# Patient Record
Sex: FEMALE | Race: BLACK OR AFRICAN AMERICAN
[De-identification: names, ages, dates, MRNs, and addresses within clinical notes are randomized per-mention and may not be internally consistent; named-entity substitution may affect disease eponyms.]

---

## 2017-07-25 ENCOUNTER — HOSPITAL ENCOUNTER (EMERGENCY)
Dept: HOSPITAL 17 - NEPK | Age: 22
Discharge: HOME | End: 2017-07-25
Payer: COMMERCIAL

## 2017-07-25 VITALS — HEIGHT: 64 IN | BODY MASS INDEX: 33.87 KG/M2 | WEIGHT: 198.42 LBS

## 2017-07-25 VITALS
TEMPERATURE: 98.2 F | RESPIRATION RATE: 14 BRPM | OXYGEN SATURATION: 98 % | HEART RATE: 81 BPM | SYSTOLIC BLOOD PRESSURE: 164 MMHG | DIASTOLIC BLOOD PRESSURE: 93 MMHG
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
## 2017-07-25 DIAGNOSIS — S93.402A: Primary | ICD-10-CM

## 2017-07-25 PROCEDURE — 73620 X-RAY EXAM OF FOOT: CPT

## 2017-07-25 PROCEDURE — 73610 X-RAY EXAM OF ANKLE: CPT

## 2017-07-25 PROCEDURE — E0113 CRUTCH UNDERARM EACH WOOD: HCPCS

## 2017-07-25 PROCEDURE — 99283 EMERGENCY DEPT VISIT LOW MDM: CPT

## 2017-07-25 NOTE — RADRPT
EXAM DATE/TIME:  07/25/2017 17:47 

 

[facility] COMPARISON:     

No previous studies available for comparison.

 

                     

INDICATIONS :     

Left foot pain post fall yesterday. 

                     

 

MEDICAL HISTORY :     

None.          

 

SURGICAL HISTORY :     

None.   

 

ENCOUNTER:     

Initial                                        

 

ACUITY:     

2 days      

 

PAIN SCORE:     

10/10

 

LOCATION:     

Left  foot. 

 

FINDINGS:     

Two view examination of the left foot demonstrates no soft tissue swelling, dislocation, or fracture.
  The calcaneus is intact.  Bony mineralization is normal.

 

CONCLUSION:     No acute disease.  

 

 

 

 Kwame Wilder MD on July 25, 2017 at 18:02           

Board Certified Radiologist.

 This report was verified electronically.

## 2017-07-25 NOTE — RADRPT
EXAM DATE/TIME:  07/25/2017 17:46 

 

[facility] COMPARISON:     

No previous studies available for comparison.

 

                     

INDICATIONS :     

Left ankle pain post fall yesterday. 

                     

 

MEDICAL HISTORY :     

None.          

 

SURGICAL HISTORY :     

None.   

 

ENCOUNTER:     

Initial                                        

 

ACUITY:     

2 days      

 

PAIN SCORE:     

10/10

 

LOCATION:     

Left ankle. 

 

FINDINGS:     

There is soft-tissue swelling adjacent to the lateral malleolus.  There is no acute fracture or dislo
cation.  The ankle mortise is intact.  

 

CONCLUSION:

1. Soft tissue swelling adjacent to the lateral malleolus.  

2. No acute fracture or dislocation.       

 

 

 

 Kwame Wilder MD on July 25, 2017 at 18:02                

Board Certified Radiologist.

 This report was verified electronically.

## 2017-07-25 NOTE — PD
HPI


Chief Complaint:  Injury


Time Seen by Provider:  17:20


Travel History


International Travel<30 days:  No


Contact w/Intl Traveler<30days:  No


Traveled to known affect area:  No





History of Present Illness


HPI


22-year-old female presents emergency department for evaluation of left ankle 

and foot pain status post twisting injury prior to arrival.  Patient reports 

she rolled the ankle while getting out of her car.  She felt immediate pain to 

the lateral aspect of the ankle and lateral foot.  She has difficulty 

weightbearing.  Pain is relieved with rest and elevation. Severity 5 out of 10.

  She denies numbness/tingling/weakness in the extremity.





Formerly [facility]


Past Medical History


Medical History:  Denies Significant Hx


Diminished Hearing:  No


Immunizations Current:  Yes


Tetanus Vaccination:  < 5 Years


Influenza Vaccination:  No


Pregnant?:  Not Pregnant


LMP:  17


:  1


Para:  0





Past Surgical History


Surgical History:  No Previous Surgery


Other Surgery:  Yes (IUD)





Social History


Alcohol Use:  No


Tobacco Use:  No


Substance Use:  Yes (MARIJUANA OCC)





Allergies-Medications


(Allergen,Severity, Reaction):  


Coded Allergies:  


     No Known Allergies (Verified , 17)


Reported Meds & Prescriptions





Reported Meds & Active Scripts


Active








Review of Systems


Except as stated in HPI:  all other systems reviewed are Neg





Physical Exam


Narrative


GENERAL: Well-nourished, well-developed patient.


SKIN: Focused skin assessment warm/dry.


HEAD: Normocephalic.


EYES: No scleral icterus. No injection or drainage. 


NECK: Supple, trachea midline. No JVD or lymphadenopathy.


CARDIOVASCULAR: Regular rate and rhythm without murmurs, gallops, or rubs. 


RESPIRATORY: Breath sounds equal bilaterally. No accessory muscle use.


GASTROINTESTINAL: Abdomen soft, non-tender, nondistended. 


MUSCULOSKELETAL: No cyanosis, or edema.  Left ankle notable swelling and 

tenderness over the lateral malleolus and base of fifth metatarsal.  2+ distal 

pulses.  No deformity.  Extremities neurovascularly intact.


BACK: Nontender without obvious deformity. No CVA tenderness.





Data


Data


Last Documented VS





Vital Signs








  Date Time  Temp Pulse Resp B/P Pulse Ox O2 Delivery O2 Flow Rate FiO2


 


17 17:26      Room Air  


 


17 16:19 98.2 81 14 164/93 98   








Orders





 Foot, Limited (2vws) (17 )


Ankle, Complete ([id number]) (17 )








[facility]


Medical Decision Making


Medical Screen Exam Complete:  Yes


Emergency Medical Condition:  Yes


Differential Diagnosis


Ankle sprain versus fracture, midfoot sprain versus metatarsal fracture


Narrative Course


22-year-old female with left ankle and foot pain status post twisting injury.  

Patient has notable swelling and tenderness to the lateral malleolus and the 

base of the fifth metatarsal.  X-ray ordered and pending





X-ray of the left ankle and foot are negative for fracture.  





Patient will be treated for ankle sprain.  Ace wrap and crutches given to 

patient.  Instructed to take over-the-counter Motrin as needed.  Patient 

verbalizes understanding and agrees to plan.





Diagnosis





 Primary Impression:  


 Ankle sprain


 Qualified Code:  S93.402A - Sprain of left ankle, unspecified ligament, 

initial encounter


Referrals:  


Primary Care Physician





***Additional Instructions:


Ice and elevate the extremity


 use the crutches until weightbearing as possible.


Review the Ace wrap for support of the ankle.


Follow-up with her primary care doctor.


Take over-the-counter Motrin [id number] milligrams by mouth every 6-8 hours as 

needed for pain.


Disposition:  01 DISCHARGE HOME


Condition:  Stable








Marialuisa Dong 2017 17:39

## 2017-12-08 ENCOUNTER — HOSPITAL ENCOUNTER (EMERGENCY)
Dept: HOSPITAL 17 - HOBED | Age: 22
Discharge: HOME | End: 2017-12-08
Payer: MEDICAID

## 2017-12-08 DIAGNOSIS — Z3A.19: ICD-10-CM

## 2017-12-08 DIAGNOSIS — O21.9: Primary | ICD-10-CM

## 2017-12-08 PROCEDURE — 99283 EMERGENCY DEPT VISIT LOW MDM: CPT

## 2017-12-08 NOTE — PD
HPI


Chief Complaint


Vomiting blood


Date Seen:  Dec 8, 2017


Time Seen:  16:17


Travel History


International Travel<30 Days:  No


Contact w/Intl Traveler<30Days:  No


Known Affected Area:  No





History of Present Illness


HPI


22-year-old  2 para 1 at 19 weeks gestation who reports that she has had 

vomiting since this morning.  She has battled nausea and vomiting of pregnancy 

up to this point and has been well-controlled with likely just.  She 

discontinued taking this yesterday.  She has a picture that she showed me from 

her phone of her vomit which shows very faint pink streaking of primarily mucus 

emesis.


She denies any fever chills, diarrhea, sickness in other family members.





History


Past Medical History


Medical History:  Denies Significant Hx





Past Surgical History


Surgical History:  No Previous Surgery





Family History


Family History:  Negative





Social History


Alcohol Use:  No


Tobacco Use:  No


Substance Abuse:  No





Allergies-Medications


(Allergen,Severity, Reaction):  


Coded Allergies:  


     No Known Allergies (Verified  Allergy, Unknown, 17)





Review of Systems


Except as stated in HPI:  all other systems reviewed are Neg





Physical Exam


Narrative


GENERAL: Well-nourished, well-developed patient.


SKIN: Warm and dry.


HEAD: Normocephalic and atraumatic.


EYES: No scleral icterus. No injection or drainage. 


ENT: No nasal drainage noted. Mucous membranes pink. Airway patent.


NECK: Supple, trachea midline. No JVD.


CARDIOVASCULAR: Regular rate and rhythm without murmurs, gallops, or rubs. 


RESPIRATORY: Breath sounds equal bilaterally. No accessory muscle use.


ABDOMEN/GI: Abdomen soft, non-tender, bowel sounds present, no rebound, no 

guarding 


   Gravid to [-] weeks size


   Fundal Height: [-]





FHT's: 


   Category: [-]   


   Baseline: [150-]   


   Reactive: [-]   


   Variability: [-]  


   Decels: [-]  


EXTREMITIES: No cyanosis or edema.


BACK: Nontender without obvious deformity. No CVA tenderness.


NEUROLOGICAL: Awake and alert. Motor and sensory grossly within normal limits. 

Five out of 5 muscle strength in all muscle groups. Normal speech.





Data


Data


Vital Signs Reviewed:  Yes


Orders





 Orders


Ondansetron  Odt (Zofran  Odt) (17 16:15)





MDM


Medical Record Reviewed:  Yes


Narrative Course / MDM


Assessment: 19 week intrauterine pregnancy with nausea and vomiting of pregnancy





Plan: The patient was given Zofran 8 mg ODT.  She was instructed to resume her 

diclegis.


Diagnosis


Diagnosis:  


 Primary Impression:  


 19 weeks gestation of pregnancy


 Additional Impression:  


 Nausea and vomiting during pregnancy prior to 22 weeks gestation


Disposition:  01 DISCHARGE HOME


Condition:  Good











Armando Solis MD Dec 8, 2017 16:21

## 2018-02-14 ENCOUNTER — HOSPITAL ENCOUNTER (EMERGENCY)
Dept: HOSPITAL 17 - HOBED | Age: 23
Discharge: HOME | End: 2018-02-14
Payer: MEDICAID

## 2018-02-14 DIAGNOSIS — N89.8: ICD-10-CM

## 2018-02-14 DIAGNOSIS — O26.893: Primary | ICD-10-CM

## 2018-02-14 DIAGNOSIS — Z3A.29: ICD-10-CM

## 2018-02-14 DIAGNOSIS — R10.30: ICD-10-CM

## 2018-02-14 PROCEDURE — 84112 EVAL AMNIOTIC FLUID PROTEIN: CPT

## 2018-02-14 PROCEDURE — 99284 EMERGENCY DEPT VISIT MOD MDM: CPT

## 2018-02-14 NOTE — PD
HPI


Chief Complaint


Abdominal pain and brown vaginal discharge 1 day


Date Seen:  2018


Time Seen:  14:22


Travel History


International Travel<30 Days:  No


Contact w/Intl Traveler<30Days:  No


Known Affected Area:  No





History of Present Illness


HPI


This is 23-year-old black female  at 29-1/2 weeks sees Dr. Lynn for 

prenatal care presents complaining of lower abdominal pain and vaginal brown 

discharge for 1 day.  The baby is active fetal heart rate tracing is reactive 

and she is not deepika.  Amnio sure was done was negative


Weeks Gestation:  29


Para:  1


:  2





History


Obstetric History


Obstetric History


One vaginal delivery





Social History


Alcohol Use:  No


Tobacco Use:  No


Substance Abuse:  No





Allergies-Medications


(Allergen,Severity, Reaction):  


Coded Allergies:  


     No Known Allergies (Verified  Allergy, Unknown, 17)





Review of Systems


General / Constitutional:  No: Fever, Weight Gain, Chills, Other


Eyes:  No: Diploplia, Blurred Vision, Visual changes, Pain, Photophobia


HENT:  No: Headaches, Vertigo, Lightheadedness


Cardiovascular:  No: Irregular Rhythm, Chest Pain or Discomfort, Palpitations, 

Tachycardia, Syncope, Varicosities, Edema, Cyanosis


Respiratory:  No: Cough, Short of Breath, Other


Gastrointestinal:  Abdominal Pain, No: Nausea, Vomiting, Diarrhea


Genitourinary:  Discharge, No: Decreased Urinary Output, Oliguria


Musculoskeletal:  No: Limited ROM, Weakness, Cramping, Edema, Pain


Skin:  No Rash, No Itching, No Dryness, No Lumps, No Change in Pigmentation, No 

Change in Nails, No Alopecia, No Lesions


Neurologic:  No: Weakness, Dizziness, Syncope, Focal Abnormalities, 

Coordination Problem, Headache, Slurred Speech, Seizures


Psychiatric:  No: Depression, Suicidal Ideations, Homicidal Ideation


Endocrine:  No: Heat Intolerance, Cold Intolerance, Polydipsia, Polyuria, Other





Physical Exam


Narrative


GENERAL: Well-nourished, well-developed patient.


SKIN: Warm and dry.


HEAD: Normocephalic and atraumatic.


EYES: No scleral icterus. No injection or drainage. 


ENT: No nasal drainage noted. Mucous membranes pink. Airway patent.


NECK: Supple, trachea midline. No JVD.


CARDIOVASCULAR: Regular rate and rhythm without murmurs, gallops, or rubs. 


RESPIRATORY: Breath sounds equal bilaterally. No accessory muscle use.


BREASTS: Bilateral exam showed no masses , no retractions, no nipple discharge.


ABDOMEN/GI: Abdomen soft, non-tender, bowel sounds present, no rebound, no 

guarding 


   Gravid to [-29] weeks size


   Fundal Height: [29-]


GENITOURINARY: 


   External Genitalia: intact and normal in appearance


    Speculum exam-- no bleeding noted in the vagina only a very minimal amount 

of brownish discharge seen, the cervix appeared noninfected thick and closed


   Cervix: [Normal closed thick-]


   Dilatation: [-0]          


   Effacement: [-0]          


   Station: [-3]  


            


   Membranes: [intact ]


   Uterine Contractions: [None-]


FHT's: 


   Category: [1-]   


   Baseline: [-133]   


   Reactive: [-R]   


   Variability: [mod-]  


   Decels: [-none]  


EXTREMITIES: No cyanosis or edema.


BACK: Nontender without obvious deformity. No CVA tenderness.


NEUROLOGICAL: Awake and alert. Motor and sensory grossly within normal limits. 

Five out of 5 muscle strength in all muscle groups. Normal speech.





Data


Data


Labs


Urine dipstick on OB ED is negative amnio sure negative





MDM


Interpretation(s)


Patient is 23-year-old black female  at 29-30 weeks patient Dr. burger awake 

presents with lower abdominal pain and vaginal brown discharge for 1 day.  No 

active bleeding no red blood seen, no leakage of fluid, fetal heart rate 

tracing is reactive and there are no contractions.  Cervix is seen on speculum 

exam noted   no infection no bleeding seen ,cervix is closed and thick, only 

small amount of brownish discharge seen.  Urine was negative.  Pain likely due 

to soft tissue strain


Plan


Plan patient be at home at bedrest for the next day or so, Tylenol as needed 

for pain, increase fluids for hydration, heating pad on lower abdomen is 

negative for symptoms.  She is to follow-up with Dr. Lynn she says tomorrow


Diagnosis


Diagnosis:  


 Primary Impression:  


 Vaginal discharge during pregnancy in third trimester


 Additional Impression:  


 Abdominal pain during pregnancy in third trimester


Disposition:  01 DISCHARGE HOME


Condition:  Stable











Jared Booth II, MD 2018 14:29

## 2018-03-20 ENCOUNTER — HOSPITAL ENCOUNTER (EMERGENCY)
Dept: HOSPITAL 17 - HOBED | Age: 23
Discharge: HOME | End: 2018-03-20
Payer: MEDICAID

## 2018-03-20 VITALS — SYSTOLIC BLOOD PRESSURE: 91 MMHG | DIASTOLIC BLOOD PRESSURE: 54 MMHG | HEART RATE: 98 BPM

## 2018-03-20 VITALS — DIASTOLIC BLOOD PRESSURE: 51 MMHG | SYSTOLIC BLOOD PRESSURE: 89 MMHG | RESPIRATION RATE: 17 BRPM | HEART RATE: 115 BPM

## 2018-03-20 VITALS — SYSTOLIC BLOOD PRESSURE: 116 MMHG | DIASTOLIC BLOOD PRESSURE: 66 MMHG | HEART RATE: 100 BPM

## 2018-03-20 VITALS — HEART RATE: 94 BPM

## 2018-03-20 VITALS — RESPIRATION RATE: 17 BRPM | SYSTOLIC BLOOD PRESSURE: 104 MMHG | DIASTOLIC BLOOD PRESSURE: 59 MMHG | HEART RATE: 87 BPM

## 2018-03-20 VITALS — HEART RATE: 103 BPM

## 2018-03-20 VITALS — HEART RATE: 95 BPM

## 2018-03-20 VITALS — HEART RATE: 61 BPM

## 2018-03-20 VITALS — HEART RATE: 97 BPM

## 2018-03-20 VITALS — HEART RATE: 88 BPM

## 2018-03-20 DIAGNOSIS — Z3A.34: ICD-10-CM

## 2018-03-20 DIAGNOSIS — O12.13: Primary | ICD-10-CM

## 2018-03-20 LAB
ALBUMIN SERPL-MCNC: 2.5 GM/DL (ref 3.4–5)
ALP SERPL-CCNC: 106 U/L (ref 45–117)
ALT SERPL-CCNC: 13 U/L (ref 10–53)
AST SERPL-CCNC: 17 U/L (ref 15–37)
BACTERIA #/AREA URNS HPF: (no result) /HPF
BASOPHILS # BLD AUTO: 0 TH/MM3 (ref 0–0.2)
BASOPHILS NFR BLD: 0.4 % (ref 0–2)
BILIRUB SERPL-MCNC: 0.3 MG/DL (ref 0.2–1)
BUN SERPL-MCNC: 6 MG/DL (ref 7–18)
CALCIUM SERPL-MCNC: 8.2 MG/DL (ref 8.5–10.1)
CHLORIDE SERPL-SCNC: 103 MEQ/L (ref 98–107)
COLOR UR: YELLOW
CREAT SERPL-MCNC: 0.55 MG/DL (ref 0.5–1)
EOSINOPHIL # BLD: 0.1 TH/MM3 (ref 0–0.4)
EOSINOPHIL NFR BLD: 0.9 % (ref 0–4)
ERYTHROCYTE [DISTWIDTH] IN BLOOD BY AUTOMATED COUNT: 13.5 % (ref 11.6–17.2)
GFR SERPLBLD BASED ON 1.73 SQ M-ARVRAT: 163 ML/MIN (ref 89–?)
GLUCOSE SERPL-MCNC: 81 MG/DL (ref 74–106)
GLUCOSE UR STRIP-MCNC: (no result) MG/DL
HCO3 BLD-SCNC: 22.2 MEQ/L (ref 21–32)
HCT VFR BLD CALC: 30.6 % (ref 35–46)
HGB BLD-MCNC: 10.3 GM/DL (ref 11.6–15.3)
HGB UR QL STRIP: (no result)
KETONES UR STRIP-MCNC: (no result) MG/DL
LYMPHOCYTES # BLD AUTO: 1.5 TH/MM3 (ref 1–4.8)
LYMPHOCYTES NFR BLD AUTO: 21.4 % (ref 9–44)
MCH RBC QN AUTO: 27.4 PG (ref 27–34)
MCHC RBC AUTO-ENTMCNC: 33.6 % (ref 32–36)
MCV RBC AUTO: 81.4 FL (ref 80–100)
MONOCYTE #: 0.5 TH/MM3 (ref 0–0.9)
MONOCYTES NFR BLD: 6.9 % (ref 0–8)
MUCOUS THREADS #/AREA URNS LPF: (no result) /LPF
NEUTROPHILS # BLD AUTO: 4.9 TH/MM3 (ref 1.8–7.7)
NEUTROPHILS NFR BLD AUTO: 70.4 % (ref 16–70)
NITRITE UR QL STRIP: (no result)
PLATELET # BLD: 254 TH/MM3 (ref 150–450)
PMV BLD AUTO: 8.1 FL (ref 7–11)
PROT SERPL-MCNC: 7.4 GM/DL (ref 6.4–8.2)
RBC # BLD AUTO: 3.76 MIL/MM3 (ref 4–5.3)
SODIUM SERPL-SCNC: 135 MEQ/L (ref 136–145)
SP GR UR STRIP: 1.03 (ref 1–1.03)
SQUAMOUS #/AREA URNS HPF: 13 /HPF (ref 0–5)
TRANS CELLS #/AREA URNS HPF: <1 /HPF
URINE LEUKOCYTE ESTERASE: (no result)
WBC # BLD AUTO: 6.9 TH/MM3 (ref 4–11)

## 2018-03-20 PROCEDURE — 82570 ASSAY OF URINE CREATININE: CPT

## 2018-03-20 PROCEDURE — 36415 COLL VENOUS BLD VENIPUNCTURE: CPT

## 2018-03-20 PROCEDURE — 80053 COMPREHEN METABOLIC PANEL: CPT

## 2018-03-20 PROCEDURE — 84156 ASSAY OF PROTEIN URINE: CPT

## 2018-03-20 PROCEDURE — 59025 FETAL NON-STRESS TEST: CPT

## 2018-03-20 PROCEDURE — 81001 URINALYSIS AUTO W/SCOPE: CPT

## 2018-03-20 PROCEDURE — 85025 COMPLETE CBC W/AUTO DIFF WBC: CPT

## 2018-03-20 PROCEDURE — 84550 ASSAY OF BLOOD/URIC ACID: CPT

## 2018-03-20 NOTE — PD
HPI


Chief Complaint


protein in urine


Date Seen:  Mar 20, 2018


Time Seen:  13:45


Travel History


International Travel<30 Days:  No


Contact w/Intl Traveler<30Days:  No


Known Affected Area:  No





History of Present Illness


HPI


There is a 23-year-old  at 34/2 weeks gestation presented to the OB ED 

today due to protein in urine at the clinic today.  She is a patient of Dr. Live.  He states that when she was in his office she is on protein in her 

urine.  He sent to the ED to make sure that she was okay.  She states that her 

blood pressures have been normal throughout her pregnancy and her previous 

pregnancy.  She has had no headaches, no spots in her vision, no weakness.





No vaginal bleeding, no leakage of fluids, no dysuria, no contractions, no 

chest pain, no shortness of breath, no bilateral calf pain, no nausea or 

vomiting.  She is feeling baby move.


Weeks Gestation:  34


Para:  1


:  2





History


Past Medical History


Medical History:  Denies Significant Hx





Obstetric History


Obstetric History





Son, 7 years old, no pregnancy complications, term via 


No history of abnormal Pap smears


Diagnosed with trichomonas earlier in the pregnancy test of cure to be done at 

36 weeks





Past Surgical History


Surgical History:  No Previous Surgery





Family History


*** Narrative Family History


Mother-hypertension


Father-DM





Social History


*** Narrative Social History


Lives with her boyfriend and her son


Currently does not work


Denies alcohol, tobacco, or illicit drug use


Alcohol Use:  No


Tobacco Use:  No


Substance Abuse:  No





Allergies-Medications


(Allergen,Severity, Reaction):  


Coded Allergies:  


     No Known Allergies (Verified  Allergy, Unknown, 17)


Narrative Medication


Diclegis


Pantoprazole


Prenatal vitamin





Review of Systems


General / Constitutional:  No: Fever, Chills


Eyes:  No: Blurred Vision, Visual changes


HENT:  No: Headaches


Cardiovascular:  No: Chest Pain or Discomfort


Respiratory:  No: Short of Breath


Gastrointestinal:  No: Nausea, Vomiting, Abdominal Pain


Genitourinary:  No: Dysuria


Skin:  No Rash


Neurologic:  No: Weakness





Physical Exam


Narrative


GENERAL: Well-nourished, well-developed patient.


SKIN: Warm and dry.


HEAD: Normocephalic and atraumatic.


EYES: No scleral icterus. No injection or drainage. 


ENT: No nasal drainage noted. Mucous membranes pink. Airway patent.


NECK: Supple, trachea midline. No JVD.


CARDIOVASCULAR: Regular rate and rhythm without murmurs, gallops, or rubs. 


RESPIRATORY: Breath sounds equal bilaterally. No accessory muscle use.


BREASTS: Bilateral exam showed no masses , no retractions, no nipple discharge.


ABDOMEN/GI: Abdomen soft, non-tender, bowel sounds present, no rebound, no 

guarding 


   Gravid to 34 weeks size


FHT's: 


   Category: 1   


   Baseline: 130s   


   Reactive: yes   


   Variability: moderate  


   Decels: none  


EXTREMITIES: No cyanosis or edema.


BACK: Nontender without obvious deformity. No CVA tenderness.


NEUROLOGICAL: Awake and alert. Motor and sensory grossly within normal limits. 

Five out of 5 muscle strength in all muscle groups. Normal speech.





Data


Data


Vital Signs Reviewed:  Yes





MDM


Plan


23-year-old  at 34/2 weeks gestation presenting due to proteinuria.  Rule 

out preeclampsia.





FHT is category 1


Patient's BP ranged from /51-66 in the ED


Will order CBC, CMP, uric acid, UA, urine protein/creatinine ratio


-Potassium was mildly low at 3.3, gave patient 40 mEq of potassium chloride p.o.


-UA showed protein was high at 30, protein/creatinine ratio was 0.24 which is 

reassuring


-Uric acid is normal


Diagnosis


Diagnosis:  


 Primary Impression:  


 Proteinuria


Disposition:  01 DISCHARGE HOME


Condition:  Stable











Diana Jin MD R1 Mar 20, 2018 13:23

## 2018-04-24 ENCOUNTER — HOSPITAL ENCOUNTER (EMERGENCY)
Dept: HOSPITAL 17 - HOBED | Age: 23
Discharge: HOME | End: 2018-04-24
Payer: MEDICAID

## 2018-04-24 VITALS — HEART RATE: 113 BPM

## 2018-04-24 VITALS — HEART RATE: 112 BPM

## 2018-04-24 VITALS — HEART RATE: 110 BPM

## 2018-04-24 VITALS — HEART RATE: 111 BPM

## 2018-04-24 VITALS — HEART RATE: 108 BPM

## 2018-04-24 VITALS — HEART RATE: 99 BPM

## 2018-04-24 VITALS — HEART RATE: 104 BPM

## 2018-04-24 VITALS — HEART RATE: 106 BPM

## 2018-04-24 VITALS — HEART RATE: 115 BPM

## 2018-04-24 VITALS — HEART RATE: 114 BPM

## 2018-04-24 DIAGNOSIS — O47.1: Primary | ICD-10-CM

## 2018-04-24 DIAGNOSIS — Z3A.39: ICD-10-CM

## 2018-04-24 LAB
COLOR UR: YELLOW
GLUCOSE UR STRIP-MCNC: (no result) MG/DL
HGB UR QL STRIP: (no result)
KETONES UR STRIP-MCNC: (no result) MG/DL
MUCOUS THREADS #/AREA URNS LPF: (no result) /LPF
NITRITE UR QL STRIP: (no result)
SP GR UR STRIP: 1.03 (ref 1–1.03)
SQUAMOUS #/AREA URNS HPF: 4 /HPF (ref 0–5)
URINE LEUKOCYTE ESTERASE: (no result)

## 2018-04-24 PROCEDURE — 81001 URINALYSIS AUTO W/SCOPE: CPT

## 2018-04-24 PROCEDURE — 59025 FETAL NON-STRESS TEST: CPT

## 2018-04-24 NOTE — PD
HPI


Travel History


International Travel<30 Days:  No


Contact w/Intl Traveler<30Days:  No


Known Affected Area:  No





History of Present Illness


HPI


23-year-old  at 39/2 weeks presents to the ED for contractions.  She is a 

patient of Dr. Mohamud.  Patient reports that she started having irregular 

contractions yesterday.  She continued to have contractions today. She reports 

they are closer and more intense, 10-15 min apart.   She states that it is 

harder to walk because of the pain.  She reports losing her mucous plug today.  

She endorses good fetal movement.  She denies leakage of fluid, vaginal bleeding

, vaginal discharge, dysuria, and fevers.





History


Past Medical History


Medical History:  Denies Significant Hx





Obstetric History


Obstetric History





1 uncomplicated vaginal delivery in 





Past Surgical History


Surgical History:  No Previous Surgery





Family History


Family History:  Negative





Social History


Alcohol Use:  No


Tobacco Use:  No


Substance Abuse:  Yes (Yes (History of marijuana use, last use was reported in 

))





Allergies-Medications


(Allergen,Severity, Reaction):  


Coded Allergies:  


     No Known Allergies (Verified  Allergy, Unknown, 17)





Review of Systems


Except as stated in HPI:  all other systems reviewed are Neg





Physical Exam





Vital Signs








  Date Time  Temp Pulse Resp B/P (MAP) Pulse Ox O2 Delivery O2 Flow Rate FiO2


 


18 16:45  110      


 


18 16:40  111      


 


18 16:35  114      


 


18 16:30  113      


 


18 16:25  108      


 


18 16:20  115      


 


18 16:15  112      








Narrative


GENERAL: Well-nourished, well-developed patient.


SKIN: Warm and dry.


HEAD: Normocephalic and atraumatic.


EYES: No scleral icterus. No injection or drainage. 


ENT: No nasal drainage noted. Mucous membranes pink. Airway patent.


NECK: Supple, trachea midline. No JVD.


CARDIOVASCULAR: Regular rate and rhythm without murmurs, gallops, or rubs. 


RESPIRATORY: Breath sounds equal bilaterally. No accessory muscle use.


ABDOMEN/GI: Abdomen soft, non-tender, bowel sounds present, no rebound, no 

guarding 


GENITOURINARY: 


   External Genitalia: intact and normal in appearance


   Cervix: Posterior


   Dilatation: 3          


   Effacement: 60%          


   Station: Posterior


   Presentation: Vertex        


   Membranes: Intact


   Uterine Contractions: Every 3 minutes


FHT's: 


   Category: 1   


   Baseline: 120


   Reactive: Yes


   Variability: Moderate


   Decels: No


EXTREMITIES: No cyanosis or edema.


BACK: Nontender without obvious deformity. No CVA tenderness.


NEUROLOGICAL: Awake and alert. Motor and sensory grossly within normal limits. 

Five out of 5 muscle strength in all muscle groups. Normal speech.





Data


Data


Orders





 Orders


Vital Signs (Adult) .ON ADMISSION (18 16:46)


^ Labor Status (18 16:46)


Fetal Heart (18 16:46)


Urinalysis - C+S If Indicated (18 16:46)


^ Non Stress Test (18 16:46)


^ Hydration (18 16:46)





MDM


Plan


23-year-old  at 39/2 weeks presents to the ED for contractions.  She is a 

patient of Dr. Mohamud.





Intrauterine Pregnancy


FHTs: 120, category 1


3cm cervical dilation on digital exam


No cervical change after 1 hour observation


GBS unknown, will obtain records


D/C and follow-up with OB/GYN


Diagnosis


Diagnosis:  


 Primary Impression:  


 Uterine contractions during pregnancy


Disposition:   DISCHARGE HOME


Condition:  Stable











Monica Howe MD R1 2018 17:08

## 2018-04-26 ENCOUNTER — HOSPITAL ENCOUNTER (INPATIENT)
Dept: HOSPITAL 17 - H2EA | Age: 23
LOS: 3 days | Discharge: HOME | End: 2018-04-29
Attending: OBSTETRICS & GYNECOLOGY | Admitting: OBSTETRICS & GYNECOLOGY
Payer: MEDICAID

## 2018-04-26 VITALS — HEART RATE: 113 BPM | DIASTOLIC BLOOD PRESSURE: 67 MMHG | SYSTOLIC BLOOD PRESSURE: 115 MMHG

## 2018-04-26 VITALS — RESPIRATION RATE: 18 BRPM | HEART RATE: 99 BPM | SYSTOLIC BLOOD PRESSURE: 110 MMHG | DIASTOLIC BLOOD PRESSURE: 63 MMHG

## 2018-04-26 VITALS — TEMPERATURE: 98.1 F

## 2018-04-26 VITALS — DIASTOLIC BLOOD PRESSURE: 61 MMHG | HEART RATE: 111 BPM | SYSTOLIC BLOOD PRESSURE: 97 MMHG

## 2018-04-26 VITALS — RESPIRATION RATE: 18 BRPM

## 2018-04-26 VITALS — RESPIRATION RATE: 16 BRPM

## 2018-04-26 DIAGNOSIS — Z3A.39: ICD-10-CM

## 2018-04-26 DIAGNOSIS — Z83.3: ICD-10-CM

## 2018-04-26 DIAGNOSIS — Z23: ICD-10-CM

## 2018-04-26 LAB
BASOPHILS # BLD AUTO: 0 TH/MM3 (ref 0–0.2)
BASOPHILS NFR BLD: 0.5 % (ref 0–2)
COLOR UR: (no result)
EOSINOPHIL # BLD: 0.1 TH/MM3 (ref 0–0.4)
EOSINOPHIL NFR BLD: 1.3 % (ref 0–4)
ERYTHROCYTE [DISTWIDTH] IN BLOOD BY AUTOMATED COUNT: 14 % (ref 11.6–17.2)
GLUCOSE UR STRIP-MCNC: (no result) MG/DL
HCT VFR BLD CALC: 32.5 % (ref 35–46)
HGB BLD-MCNC: 10.8 GM/DL (ref 11.6–15.3)
HGB UR QL STRIP: (no result)
KETONES UR STRIP-MCNC: (no result) MG/DL
LYMPHOCYTES # BLD AUTO: 1.7 TH/MM3 (ref 1–4.8)
LYMPHOCYTES NFR BLD AUTO: 22.3 % (ref 9–44)
MCH RBC QN AUTO: 26.2 PG (ref 27–34)
MCHC RBC AUTO-ENTMCNC: 33.3 % (ref 32–36)
MCV RBC AUTO: 78.7 FL (ref 80–100)
MONOCYTE #: 0.5 TH/MM3 (ref 0–0.9)
MONOCYTES NFR BLD: 6.3 % (ref 0–8)
NEUTROPHILS # BLD AUTO: 5.2 TH/MM3 (ref 1.8–7.7)
NEUTROPHILS NFR BLD AUTO: 69.6 % (ref 16–70)
NITRITE UR QL STRIP: (no result)
PLATELET # BLD: 298 TH/MM3 (ref 150–450)
PMV BLD AUTO: 8.5 FL (ref 7–11)
RBC # BLD AUTO: 4.13 MIL/MM3 (ref 4–5.3)
SP GR UR STRIP: 1 (ref 1–1.03)
SQUAMOUS #/AREA URNS HPF: <1 /HPF (ref 0–5)
URINE LEUKOCYTE ESTERASE: (no result)
WBC # BLD AUTO: 7.5 TH/MM3 (ref 4–11)

## 2018-04-26 PROCEDURE — 3E0R3BZ INTRODUCTION OF ANESTHETIC AGENT INTO SPINAL CANAL, PERCUTANEOUS APPROACH: ICD-10-PCS | Performed by: OBSTETRICS & GYNECOLOGY

## 2018-04-26 PROCEDURE — 10907ZC DRAINAGE OF AMNIOTIC FLUID, THERAPEUTIC FROM PRODUCTS OF CONCEPTION, VIA NATURAL OR ARTIFICIAL OPENING: ICD-10-PCS | Performed by: OBSTETRICS & GYNECOLOGY

## 2018-04-26 PROCEDURE — 85025 COMPLETE CBC W/AUTO DIFF WBC: CPT

## 2018-04-26 PROCEDURE — 90715 TDAP VACCINE 7 YRS/> IM: CPT

## 2018-04-26 PROCEDURE — 80307 DRUG TEST PRSMV CHEM ANLYZR: CPT

## 2018-04-26 PROCEDURE — G0481 DRUG TEST DEF 8-14 CLASSES: HCPCS

## 2018-04-26 PROCEDURE — 86900 BLOOD TYPING SEROLOGIC ABO: CPT

## 2018-04-26 PROCEDURE — 00HU33Z INSERTION OF INFUSION DEVICE INTO SPINAL CANAL, PERCUTANEOUS APPROACH: ICD-10-PCS | Performed by: OBSTETRICS & GYNECOLOGY

## 2018-04-26 PROCEDURE — 86901 BLOOD TYPING SEROLOGIC RH(D): CPT

## 2018-04-26 PROCEDURE — 59025 FETAL NON-STRESS TEST: CPT

## 2018-04-26 PROCEDURE — 81001 URINALYSIS AUTO W/SCOPE: CPT

## 2018-04-26 RX ADMIN — OXYTOCIN SCH MLS/HR: 10 INJECTION, SOLUTION INTRAMUSCULAR; INTRAVENOUS at 16:00

## 2018-04-26 RX ADMIN — OXYTOCIN SCH MLS/HR: 10 INJECTION, SOLUTION INTRAMUSCULAR; INTRAVENOUS at 17:01

## 2018-04-26 NOTE — HHI.HP
HPI


Chief Complaint


induction of labor


term pregnancy


Date Seen:  2018


Travel History


International Travel<30 Days:  No


Contact w/Intl Traveler<30Days:  No





History of Present Illness


HPI


39 4/7 gestation


pt has been contacting


westley in office /soft post


denies ROM


positive fetal movement


Weeks Gestation:  39


Para:  1


:  2


Miscarriage:  0


:  0





History


Past Medical History


Medical History:  Denies Significant Hx





Obstetric History


Obstetric History


Prenatal labs negative


positive trich 10/2017, GONZALO negative 2017 and 2018


GBS negative





Past Surgical History


*** Narrative Surgical


none





Family History


*** Narrative Family History


Hypertension- mother and maternal grandmother


Diabetes melitis- father


Colon Cancer Maternal grandfather





Social History


Alcohol Use:  No


Tobacco Use:  No


Substance Abuse:  Yes





Allergies-Medications


(Allergen,Severity, Reaction):  


Coded Allergies:  


     Penicillins (Verified  Allergy, Severe, 18)


 itching


     amoxicillin (Verified  Allergy, Severe, 18)


 itching


Home Meds


Active Scripts


Oxycodone HCl/Acetaminophen (Oxycodone-Acetaminophen 5-325) 5 Mg-325 Mg Tablet, 

1 TAB PO Q4H Y for moderate pain, #12 TAB


   Prov:EDUARDO Lynn MD         18


Ibuprofen (Ibuprofen) 800 Mg Tab, 800 MG PO Q8H Y for POSTPARTUM CRAMPING, #30 

TAB


   Prov:EDUARDO Lynn MD         18


Reported Medications


Prenatal Mv & Min W/Fe Prot Lisa (Prenatal + Complete Multi 18-0.8 & 290 mg) 18 

Mg Iron-800 Mcg-290 Mg-225 Mg Andrea


   18


Ferrous Sulfate (Iron) 18 Mg Tab


   18


Pantoprazole (Protonix) 40 Mg Tab, 40 MG PO BID for Reflux, #30 TAB 0 Refills


   18





Review of Systems


Except as stated in HPI:  all other systems reviewed are Neg





Physical Exam


Narrative


GENERAL: Well-nourished, well-developed patient.


SKIN: Warm and dry.


HEAD: Normocephalic and atraumatic.


EYES: No scleral icterus. No injection or drainage. 


ENT: No nasal drainage noted. Airway patent.


CARDIOVASCULAR: Regular rate and rhythm without murmurs, gallops, or rubs. 


RESPIRATORY: Breath sounds equal bilaterally. No accessory muscle use.


ABDOMEN/GI: Abdomen soft, non-tender, bowel sounds present, no rebound, no 

guarding 


   Gravid to [-40] weeks size


   Fundal Height: [40-]


GENITOURINARY: 


   External Genitalia: intact and normal in appearance


   BUS glands: [-]


   Cervix: soft


   Dilatation: 4        


   Effacement: 70         


   Station: -2  


   Presentation:v       


   Membranes: intact 


   Uterine Contractions: mild irreg


FHT's: 


   Category:1  


   Baseline: 135-145  


   Reactive: yes  


   Variability: moderate  


   Decels: none 


EXTREMITIES: No cyanosis or edema.


BACK: Nontender without obvious deformity. 


NEUROLOGICAL: Awake and alert. Motor and sensory grossly within normal limits. 

Five out of 5 muscle strength in all muscle groups. Normal speech.





Caprini VTE Risk Assessment


Caprini VTE Risk Assessment:  No/Low Risk (score <= 1)


Caprini Risk Assessment Model











 Point Value = 1          Point Value = 2  Point Value = 3  Point Value = 5


 


Age 41-60


Minor surgery


BMI > 25 kg/m2


Swollen legs


Varicose veins


Pregnancy or postpartum


History of unexplained or recurrent


   spontaneous 


Oral contraceptives or hormone


   replacement


Sepsis (< 1 month)


Serious lung disease, including


   pneumonia (< 1 month)


Abnormal pulmonary function


Acute myocardial infarction


Congestive heart failure (< 1 month)


History of inflammatory bowel disease


Medical patient at bed rest Age 61-74


Arthroscopic surgery


Major open surgery (> 45 min)


Laparoscopic surgery (> 45 min)


Malignancy


Confined to bed (> 72 hours)


Immobilizing plaster cast


Central venous access Age >= 75


History of VTE


Family history of VTE


Factor V Leiden


Prothrombin [id number]


Lupus anticoagulant


Anticardiolipin antibodies


Elevated serum homocysteine


Heparin-induced thrombocytopenia


Other congenital or acquired


   thrombophilia Stroke (< 1 month)


Elective arthroplasty


Hip, pelvis, or leg fracture


Acute spinal cord injury (< 1 month)








Prophylaxis Regimen











   Total Risk


Factor Score Risk Level Prophylaxis Regimen


 


0-1      Low Early ambulation


 


2 Moderate Order ONE of the following:


*Sequential Compression Device (SCD)


*Heparin 5000 units SQ BID


 


3-4 Higher Order ONE of the following medications:


*Heparin 5000 units SQ TID


*Enoxaparin/Lovenox 40 mg SQ daily (WT < 150 kg, CrCl > 30 mL/min)


*Enoxaparin/Lovenox 30 mg SQ daily (WT < 150 kg, CrCl > 10-29 mL/min)


*Enoxaparin/Lovenox 30 mg SQ BID (WT < 150 kg, CrCl > 30 mL/min)


AND/OR


*Sequential Compression Device (SCD)


 


5 or more Highest Order ONE of the following medications:


*Heparin 5000 units SQ TID (Preferred with Epidurals)


*Enoxaparin/Lovenox 40 mg SQ daily (WT < 150 kg, CrCl > 30 mL/min)


*Enoxaparin/Lovenox 30 mg SQ daily (WT < 150 kg, CrCl > 10-29 mL/min)


*Enoxaparin/Lovenox 30 mg SQ BID (WT < 150 kg, CrCl > 30 mL/min)


AND


*Sequential Compression Device (SCD)











Data


Data


Vital Signs Reviewed:  Yes


Orders





 Orders


Admit To Inpatient (18 )


Vital Signs (Adult) .Per protocol (18 15:42)


Fetal Heart (18 15:42)


Amnioinfusion (18 15:42)


Urinary Catheter Management .ONCE (18 15:42)


Complete Blood Count With Diff (18 15:42)


Hold Clot (18 15:42)


Abo/Rh Blood Type (18 15:42)


Urinalysis - C+S If Indicated (18 15:42)


Drug Screen, Random Urine (18 15:42)


Ob/Psych Drug Screen, Urine (18 15:42)


Resp Oxygen Non Rebreathe Mask (18 )


^ Epidural / Intrathecal Infus (18 15:42)


Specimen To Be Collected PRN (18 15:42)


Specimen To Be Collected PRN (18 15:42)


Diet Clear Liquid (18 Dinner)


^ Non Stress Test (18 16:40)


Response To Medication .Post New Med Administration, Reaction (18 16:40)


^ Discontinue Medication (18 16:40)


Instruction (18 16:40)


Group B Strep:  Negative


Labs





Laboratory Tests








Test


  18


15:37


 


White Blood Count 7.5 


 


Red Blood Count 4.13 


 


Hemoglobin 10.8 


 


Hematocrit 32.5 


 


Mean Corpuscular Volume 78.7 


 


Mean Corpuscular Hemoglobin 26.2 


 


Mean Corpuscular Hemoglobin


Concent 33.3 


 


 


Red Cell Distribution Width 14.0 


 


Platelet Count 298 


 


Mean Platelet Volume 8.5 


 


Neutrophils (%) (Auto) 69.6 


 


Lymphocytes (%) (Auto) 22.3 


 


Monocytes (%) (Auto) 6.3 


 


Eosinophils (%) (Auto) 1.3 


 


Basophils (%) (Auto) 0.5 


 


Neutrophils # (Auto) 5.2 


 


Lymphocytes # (Auto) 1.7 


 


Monocytes # (Auto) 0.5 


 


Eosinophils # (Auto) 0.1 


 


Basophils # (Auto) 0.0 


 


CBC Comment DIFF FINAL 


 


Differential Comment  











Assessment/Plan


Problem List:  


(1) Term pregnancy


ICD Codes:  Z34.80 - Encounter for supervision of other normal pregnancy, 

unspecified trimester


(2) Encounter for induction of labor


ICD Codes:  Z34.90 - Encounter for supervision of normal pregnancy, unspecified

, unspecified trimester


Assessment and Plan


in the office pt was deepika irregularly, sve 470/-2/soft


pt admitted for induction of labor


low dose pitocin started and will be increased at 3am with arom


plan to progress to 


Discharge Planning


in 2 days pending delivery











Paloma Jacobsen 2018 16:56

## 2018-04-27 VITALS — DIASTOLIC BLOOD PRESSURE: 51 MMHG | HEART RATE: 112 BPM | SYSTOLIC BLOOD PRESSURE: 107 MMHG | OXYGEN SATURATION: 100 %

## 2018-04-27 VITALS — SYSTOLIC BLOOD PRESSURE: 82 MMHG | HEART RATE: 100 BPM | DIASTOLIC BLOOD PRESSURE: 28 MMHG

## 2018-04-27 VITALS — HEART RATE: 99 BPM | DIASTOLIC BLOOD PRESSURE: 79 MMHG | SYSTOLIC BLOOD PRESSURE: 118 MMHG

## 2018-04-27 VITALS — SYSTOLIC BLOOD PRESSURE: 109 MMHG | HEART RATE: 112 BPM | DIASTOLIC BLOOD PRESSURE: 83 MMHG | OXYGEN SATURATION: 100 %

## 2018-04-27 VITALS — HEART RATE: 117 BPM | OXYGEN SATURATION: 100 % | DIASTOLIC BLOOD PRESSURE: 42 MMHG | SYSTOLIC BLOOD PRESSURE: 111 MMHG

## 2018-04-27 VITALS — OXYGEN SATURATION: 100 % | HEART RATE: 107 BPM

## 2018-04-27 VITALS — OXYGEN SATURATION: 100 % | HEART RATE: 101 BPM

## 2018-04-27 VITALS — OXYGEN SATURATION: 100 % | SYSTOLIC BLOOD PRESSURE: 88 MMHG | HEART RATE: 89 BPM | DIASTOLIC BLOOD PRESSURE: 44 MMHG

## 2018-04-27 VITALS — OXYGEN SATURATION: 100 % | DIASTOLIC BLOOD PRESSURE: 49 MMHG | SYSTOLIC BLOOD PRESSURE: 94 MMHG | HEART RATE: 98 BPM

## 2018-04-27 VITALS — OXYGEN SATURATION: 100 % | SYSTOLIC BLOOD PRESSURE: 95 MMHG | DIASTOLIC BLOOD PRESSURE: 69 MMHG | HEART RATE: 107 BPM

## 2018-04-27 VITALS — DIASTOLIC BLOOD PRESSURE: 52 MMHG | SYSTOLIC BLOOD PRESSURE: 87 MMHG | HEART RATE: 114 BPM

## 2018-04-27 VITALS — SYSTOLIC BLOOD PRESSURE: 91 MMHG | HEART RATE: 108 BPM | DIASTOLIC BLOOD PRESSURE: 68 MMHG

## 2018-04-27 VITALS — SYSTOLIC BLOOD PRESSURE: 85 MMHG | HEART RATE: 93 BPM | DIASTOLIC BLOOD PRESSURE: 33 MMHG

## 2018-04-27 VITALS — DIASTOLIC BLOOD PRESSURE: 68 MMHG | HEART RATE: 112 BPM | SYSTOLIC BLOOD PRESSURE: 125 MMHG

## 2018-04-27 VITALS — SYSTOLIC BLOOD PRESSURE: 96 MMHG | HEART RATE: 99 BPM | OXYGEN SATURATION: 97 % | DIASTOLIC BLOOD PRESSURE: 50 MMHG

## 2018-04-27 VITALS — SYSTOLIC BLOOD PRESSURE: 98 MMHG | OXYGEN SATURATION: 100 % | HEART RATE: 104 BPM | DIASTOLIC BLOOD PRESSURE: 43 MMHG

## 2018-04-27 VITALS
SYSTOLIC BLOOD PRESSURE: 111 MMHG | RESPIRATION RATE: 20 BRPM | TEMPERATURE: 97.9 F | DIASTOLIC BLOOD PRESSURE: 80 MMHG | HEART RATE: 107 BPM

## 2018-04-27 VITALS — HEART RATE: 100 BPM | SYSTOLIC BLOOD PRESSURE: 84 MMHG | DIASTOLIC BLOOD PRESSURE: 45 MMHG

## 2018-04-27 VITALS — SYSTOLIC BLOOD PRESSURE: 102 MMHG | HEART RATE: 246 BPM | DIASTOLIC BLOOD PRESSURE: 67 MMHG

## 2018-04-27 VITALS — DIASTOLIC BLOOD PRESSURE: 76 MMHG | OXYGEN SATURATION: 100 % | HEART RATE: 111 BPM | SYSTOLIC BLOOD PRESSURE: 112 MMHG

## 2018-04-27 VITALS — SYSTOLIC BLOOD PRESSURE: 115 MMHG | DIASTOLIC BLOOD PRESSURE: 77 MMHG | HEART RATE: 111 BPM

## 2018-04-27 VITALS — SYSTOLIC BLOOD PRESSURE: 107 MMHG | DIASTOLIC BLOOD PRESSURE: 77 MMHG | HEART RATE: 118 BPM

## 2018-04-27 VITALS — HEART RATE: 111 BPM | DIASTOLIC BLOOD PRESSURE: 42 MMHG | SYSTOLIC BLOOD PRESSURE: 81 MMHG

## 2018-04-27 VITALS — RESPIRATION RATE: 20 BRPM | TEMPERATURE: 97.7 F

## 2018-04-27 VITALS — OXYGEN SATURATION: 100 % | HEART RATE: 112 BPM

## 2018-04-27 VITALS — HEART RATE: 117 BPM | DIASTOLIC BLOOD PRESSURE: 63 MMHG | SYSTOLIC BLOOD PRESSURE: 95 MMHG

## 2018-04-27 VITALS — OXYGEN SATURATION: 100 % | HEART RATE: 104 BPM

## 2018-04-27 VITALS — DIASTOLIC BLOOD PRESSURE: 69 MMHG | HEART RATE: 104 BPM | SYSTOLIC BLOOD PRESSURE: 105 MMHG

## 2018-04-27 VITALS
SYSTOLIC BLOOD PRESSURE: 119 MMHG | DIASTOLIC BLOOD PRESSURE: 76 MMHG | TEMPERATURE: 98 F | HEART RATE: 97 BPM | RESPIRATION RATE: 18 BRPM

## 2018-04-27 VITALS — HEART RATE: 101 BPM | OXYGEN SATURATION: 100 %

## 2018-04-27 VITALS — HEART RATE: 105 BPM | OXYGEN SATURATION: 100 %

## 2018-04-27 VITALS — TEMPERATURE: 97.7 F | RESPIRATION RATE: 18 BRPM

## 2018-04-27 VITALS — TEMPERATURE: 97.9 F | RESPIRATION RATE: 18 BRPM

## 2018-04-27 VITALS — OXYGEN SATURATION: 99 % | HEART RATE: 109 BPM | RESPIRATION RATE: 18 BRPM

## 2018-04-27 VITALS — DIASTOLIC BLOOD PRESSURE: 40 MMHG | SYSTOLIC BLOOD PRESSURE: 84 MMHG | OXYGEN SATURATION: 100 % | HEART RATE: 97 BPM

## 2018-04-27 VITALS — HEART RATE: 102 BPM | DIASTOLIC BLOOD PRESSURE: 65 MMHG | SYSTOLIC BLOOD PRESSURE: 106 MMHG

## 2018-04-27 VITALS — OXYGEN SATURATION: 100 % | HEART RATE: 109 BPM

## 2018-04-27 VITALS — HEART RATE: 95 BPM | OXYGEN SATURATION: 100 %

## 2018-04-27 VITALS — HEART RATE: 112 BPM | SYSTOLIC BLOOD PRESSURE: 82 MMHG | DIASTOLIC BLOOD PRESSURE: 52 MMHG

## 2018-04-27 VITALS — SYSTOLIC BLOOD PRESSURE: 95 MMHG | DIASTOLIC BLOOD PRESSURE: 53 MMHG | HEART RATE: 99 BPM

## 2018-04-27 VITALS — OXYGEN SATURATION: 100 % | HEART RATE: 110 BPM

## 2018-04-27 VITALS — DIASTOLIC BLOOD PRESSURE: 56 MMHG | HEART RATE: 82 BPM | SYSTOLIC BLOOD PRESSURE: 105 MMHG | OXYGEN SATURATION: 100 %

## 2018-04-27 VITALS — SYSTOLIC BLOOD PRESSURE: 120 MMHG | DIASTOLIC BLOOD PRESSURE: 71 MMHG | HEART RATE: 99 BPM

## 2018-04-27 VITALS — OXYGEN SATURATION: 100 % | HEART RATE: 113 BPM

## 2018-04-27 VITALS — DIASTOLIC BLOOD PRESSURE: 53 MMHG | SYSTOLIC BLOOD PRESSURE: 99 MMHG | HEART RATE: 89 BPM | OXYGEN SATURATION: 100 %

## 2018-04-27 VITALS — HEART RATE: 100 BPM | OXYGEN SATURATION: 100 % | DIASTOLIC BLOOD PRESSURE: 45 MMHG | SYSTOLIC BLOOD PRESSURE: 105 MMHG

## 2018-04-27 VITALS — OXYGEN SATURATION: 99 % | HEART RATE: 104 BPM

## 2018-04-27 VITALS — SYSTOLIC BLOOD PRESSURE: 99 MMHG | DIASTOLIC BLOOD PRESSURE: 65 MMHG | HEART RATE: 90 BPM

## 2018-04-27 VITALS — HEART RATE: 110 BPM | OXYGEN SATURATION: 100 %

## 2018-04-27 VITALS — OXYGEN SATURATION: 100 % | SYSTOLIC BLOOD PRESSURE: 103 MMHG | DIASTOLIC BLOOD PRESSURE: 52 MMHG | HEART RATE: 104 BPM

## 2018-04-27 VITALS — SYSTOLIC BLOOD PRESSURE: 100 MMHG | DIASTOLIC BLOOD PRESSURE: 55 MMHG | HEART RATE: 93 BPM

## 2018-04-27 VITALS — DIASTOLIC BLOOD PRESSURE: 50 MMHG | HEART RATE: 104 BPM | SYSTOLIC BLOOD PRESSURE: 102 MMHG

## 2018-04-27 VITALS — HEART RATE: 107 BPM | OXYGEN SATURATION: 100 %

## 2018-04-27 VITALS — SYSTOLIC BLOOD PRESSURE: 86 MMHG | OXYGEN SATURATION: 100 % | DIASTOLIC BLOOD PRESSURE: 64 MMHG | HEART RATE: 114 BPM

## 2018-04-27 VITALS — DIASTOLIC BLOOD PRESSURE: 46 MMHG | HEART RATE: 87 BPM | SYSTOLIC BLOOD PRESSURE: 101 MMHG

## 2018-04-27 VITALS — HEART RATE: 107 BPM | DIASTOLIC BLOOD PRESSURE: 57 MMHG | SYSTOLIC BLOOD PRESSURE: 93 MMHG

## 2018-04-27 VITALS — DIASTOLIC BLOOD PRESSURE: 79 MMHG | HEART RATE: 104 BPM | SYSTOLIC BLOOD PRESSURE: 121 MMHG

## 2018-04-27 VITALS — OXYGEN SATURATION: 100 % | HEART RATE: 115 BPM

## 2018-04-27 VITALS — SYSTOLIC BLOOD PRESSURE: 106 MMHG | HEART RATE: 100 BPM | DIASTOLIC BLOOD PRESSURE: 49 MMHG

## 2018-04-27 VITALS — OXYGEN SATURATION: 100 % | HEART RATE: 99 BPM

## 2018-04-27 VITALS — SYSTOLIC BLOOD PRESSURE: 85 MMHG | HEART RATE: 116 BPM | DIASTOLIC BLOOD PRESSURE: 53 MMHG

## 2018-04-27 VITALS — SYSTOLIC BLOOD PRESSURE: 95 MMHG | DIASTOLIC BLOOD PRESSURE: 44 MMHG | HEART RATE: 101 BPM

## 2018-04-27 VITALS — HEART RATE: 111 BPM | SYSTOLIC BLOOD PRESSURE: 103 MMHG | DIASTOLIC BLOOD PRESSURE: 50 MMHG

## 2018-04-27 VITALS — HEART RATE: 92 BPM | SYSTOLIC BLOOD PRESSURE: 98 MMHG | DIASTOLIC BLOOD PRESSURE: 53 MMHG

## 2018-04-27 VITALS — HEART RATE: 91 BPM | DIASTOLIC BLOOD PRESSURE: 83 MMHG | SYSTOLIC BLOOD PRESSURE: 102 MMHG

## 2018-04-27 VITALS — RESPIRATION RATE: 18 BRPM

## 2018-04-27 VITALS — OXYGEN SATURATION: 100 % | HEART RATE: 96 BPM

## 2018-04-27 VITALS — DIASTOLIC BLOOD PRESSURE: 60 MMHG | SYSTOLIC BLOOD PRESSURE: 101 MMHG | HEART RATE: 118 BPM

## 2018-04-27 VITALS — HEART RATE: 106 BPM | OXYGEN SATURATION: 100 %

## 2018-04-27 RX ADMIN — Medication PRN MG: at 01:46

## 2018-04-27 RX ADMIN — IBUPROFEN PRN MG: 800 TABLET, FILM COATED ORAL at 20:33

## 2018-04-27 RX ADMIN — OXYTOCIN SCH MLS/HR: 10 INJECTION, SOLUTION INTRAMUSCULAR; INTRAVENOUS at 01:10

## 2018-04-27 RX ADMIN — STANDARDIZED SENNA CONCENTRATE AND DOCUSATE SODIUM PRN TAB: 8.6; 5 TABLET, FILM COATED ORAL at 12:37

## 2018-04-27 RX ADMIN — Medication PRN MG: at 01:10

## 2018-04-27 RX ADMIN — IBUPROFEN PRN MG: 800 TABLET, FILM COATED ORAL at 12:30

## 2018-04-27 RX ADMIN — OXYCODONE HYDROCHLORIDE AND ACETAMINOPHEN PRN TAB: 5; 325 TABLET ORAL at 20:33

## 2018-04-27 NOTE — HHI.DCPOC
Discharge Care Plan


Diagnosis:  


(1) Normal vaginal delivery


(2) Anemia








Your Health Problems Are: Vaginal delivery








Report Symptoms to Your Doctor


-Temperature above 100.5 degrees


-Redness, of incision or excessive or foul smelling drainage


-Unusual pain or calf pain


-Increased vaginal bleeding


-Painful or difficulty urinating


-Feelings of extreme sadness or anxiety after 2 weeks


Goals to Promote Your Health


* To prevent worsening of your condition and complications


* To maintain your health at the optimal level


Directions to Meet Your Goals


*** Take your medications as prescribed


*** Follow your dietary instruction


*** Follow activity as directed


*** Ensure plenty of rest for recovery


*** Drink fluids for hydration








*** Keep your appointments as scheduled


*** Take your immunizations and boosters as scheduled


*** If your symptoms worsen call your PCP, if no PCP go to Urgent Care Center 

or Emergency Room***


*** Smoking is Dangerous to Your Health. Avoid second hand smoke***


***Call the 24-hour crisis hotline for domestic abuse at 1-776.534.9788***











Paloma Jacobsen Apr 27, 2018 09:02

## 2018-04-27 NOTE — PD.OB.DELI
Weeks gestation:  39


Gest age assessed date:  2018


Pt started active labor?:  Yes


Active labor start date:  2018


Medical induction of labor?:  Yes


Medical induction start date:  2018


Artificial rupture of membrane:  No


Anesthesia:  Epidural


Episiotomy:  None


Vaginal Delivery:  Normal


Presentation:  Occiput anterior


Nuchal Cord:  x1


Delayed cord clamping (45 sec):  Yes


Shoulder Dystocia:  Elena maneuver done


Infant:  Female


Delivery date:  2018


Delivery time:  05:04


One Minute APGAR:  8


Five Minute APGAR:  9


Placenta:  Spontaneous delivery, Intact, 3 vessel cord


Laceration:  No lacerations


Estimated blood loss:  300


Additional Information


Nice delivery


Mild shoulder dystocia relieved in 30 seconds


Good erik reflex


No lacerations at all











EDUARDO Lynn MD 2018 05:24

## 2018-04-28 VITALS
RESPIRATION RATE: 18 BRPM | TEMPERATURE: 98.3 F | HEART RATE: 73 BPM | SYSTOLIC BLOOD PRESSURE: 137 MMHG | DIASTOLIC BLOOD PRESSURE: 87 MMHG

## 2018-04-28 VITALS
TEMPERATURE: 97.8 F | HEART RATE: 77 BPM | RESPIRATION RATE: 20 BRPM | DIASTOLIC BLOOD PRESSURE: 84 MMHG | SYSTOLIC BLOOD PRESSURE: 133 MMHG

## 2018-04-28 RX ADMIN — OXYCODONE HYDROCHLORIDE AND ACETAMINOPHEN PRN TAB: 5; 325 TABLET ORAL at 08:29

## 2018-04-28 RX ADMIN — STANDARDIZED SENNA CONCENTRATE AND DOCUSATE SODIUM PRN TAB: 8.6; 5 TABLET, FILM COATED ORAL at 08:29

## 2018-04-28 RX ADMIN — OXYCODONE HYDROCHLORIDE AND ACETAMINOPHEN PRN TAB: 5; 325 TABLET ORAL at 01:56

## 2018-04-28 RX ADMIN — OXYCODONE HYDROCHLORIDE AND ACETAMINOPHEN PRN TAB: 5; 325 TABLET ORAL at 22:28

## 2018-04-28 RX ADMIN — IBUPROFEN PRN MG: 800 TABLET, FILM COATED ORAL at 17:25

## 2018-04-28 RX ADMIN — IBUPROFEN PRN MG: 800 TABLET, FILM COATED ORAL at 08:29

## 2018-04-28 NOTE — HHI.OB
Subjective


Post Partum Day:  1


Remarks


s/p full term vaginal delivery of healthy  female infant





Objective


Vitals/I&O





Vital Signs








  Date Time  Temp Pulse Resp B/P (MAP) Pulse Ox O2 Delivery O2 Flow Rate FiO2


 


18 08:00    133/84 (100)    


 


18 08:00 97.8 77 20     


 


18 20:00  97  119/76 (90)    


 


18 20:00 98.0  18     








Objective Remarks


GENERAL: Well-nourished, well-developed patient.  Breastfeeding. 


CARDIOVASCULAR: Regular rate and rhythm without murmurs, gallops, or rubs. 


RESPIRATORY: Breath sounds equal bilaterally. No accessory muscle use.


ABDOMEN/GI: Abdomen soft, non-tender. 


   Fundus: Firm, non-tender at umbilicus.


GENITOURINARY: Light to moderate bleeding.


EXTREMITIES: No cyanosis or edema, non-tender, without signs of DVT.


Medications and IVs





Current Medications








 Medications


  (Trade)  Dose


 Ordered  Sig/Omer


 Route  Start Time


 Stop Time Status Last Admin


 


  (NS Flush)  2 ml  BID


 IV FLUSH  18 09:00


     


 


 


  (NS Flush)  2 ml  UNSCH  PRN


 IV FLUSH  18 05:30


     


 


 


  (Tylenol)  650 mg  Q4H  PRN


 PO  18 05:30


    18 15:42


 


 


  (Motrin)  800 mg  Q8H  PRN


 PO  18 05:30


    18 08:29


 


 


  (Percocet  5-325


 Mg)  1 tab  Q4H  PRN


 PO  18 05:30


    18 08:29


 


 


  (Percocet  5-325


 Mg)  2 tab  Q4H  PRN


 PO  18 05:30


     


 


 


  (Americaine 20%


 Top Spr)  1 spray  Q4H  PRN


 TOPICAL  18 05:30


     


 


 


  (Tucks Pads)  1 applic  QID  PRN


 TOPICAL  18 05:30


     


 


 


  (Iris-Colace)  2 tab  Q12H  PRN


 PO  18 05:30


    18 08:29


 


 


  (Ambien)  5 mg  HS  PRN


 PO  18 05:30


     


 


 


  (Mag-Al Plus


 Susp Liq)  15 ml  Q8H  PRN


 PO  18 05:30


     


 


 


  (Zofran  Odt)  4 mg  Q6H  PRN


 PO  18 05:30


     


 











Assessment/Plan


Problem List:  


(1) Normal vaginal delivery


ICD Codes:  O80 - Encounter for full-term uncomplicated delivery


Status:  Acute


(2) Term pregnancy


ICD Codes:  Z34.80 - Encounter for supervision of other normal pregnancy, 

unspecified trimester


(3) Encounter for induction of labor


ICD Codes:  Z34.90 - Encounter for supervision of normal pregnancy, unspecified

, unspecified trimester


Assessment and Plan


PPD#1


breastfeeding


continue supportive care


anticipate d/c to home PPD#2


Discharge Planning


tmrw











Shanda Call MD 2018 09:20

## 2018-04-29 VITALS
TEMPERATURE: 97.9 F | DIASTOLIC BLOOD PRESSURE: 77 MMHG | HEART RATE: 81 BPM | SYSTOLIC BLOOD PRESSURE: 124 MMHG | RESPIRATION RATE: 16 BRPM

## 2018-04-29 RX ADMIN — OXYCODONE HYDROCHLORIDE AND ACETAMINOPHEN PRN TAB: 5; 325 TABLET ORAL at 07:39

## 2018-04-29 RX ADMIN — IBUPROFEN PRN MG: 800 TABLET, FILM COATED ORAL at 01:11

## 2018-04-29 NOTE — HHI.OB
Subjective


Post Partum Day:  2


Remarks


doing well but does c/o of back pain at epidural site, inconsistent HA symptoms

, would like evaluation with anesthesia team before discharge





Objective


Vitals/I&O





Vital Signs








  Date Time  Temp Pulse Resp B/P (MAP) Pulse Ox O2 Delivery O2 Flow Rate FiO2


 


4/28/18 20:00 98.3 73 18 137/87 (104)    








Objective Remarks


GENERAL: Well-nourished, well-developed patient.  Breastfeeding. 


CARDIOVASCULAR: Regular rate and rhythm without murmurs, gallops, or rubs. 


RESPIRATORY: Breath sounds equal bilaterally. No accessory muscle use.


ABDOMEN/GI: Abdomen soft, non-tender. 


   Fundus: Firm, non-tender at umbilicus.


GENITOURINARY: Light to moderate bleeding.


EXTREMITIES: No cyanosis or edema, non-tender, without signs of DVT.


BACK: no CVA tenderness, no bruising or swelling, no tenderness to palpation


Medications and IVs





Current Medications








 Medications


  (Trade)  Dose


 Ordered  Sig/Omer


 Route  Start Time


 Stop Time Status Last Admin


 


  (NS Flush)  2 ml  BID


 IV FLUSH  4/27/18 09:00


     


 


 


  (NS Flush)  2 ml  UNSCH  PRN


 IV FLUSH  4/27/18 05:30


     


 


 


  (Tylenol)  650 mg  Q4H  PRN


 PO  4/27/18 05:30


    4/27/18 15:42


 


 


  (Motrin)  800 mg  Q8H  PRN


 PO  4/27/18 05:30


    4/29/18 01:11


 


 


  (Percocet  5-325


 Mg)  1 tab  Q4H  PRN


 PO  4/27/18 05:30


    4/29/18 07:39


 


 


  (Percocet  5-325


 Mg)  2 tab  Q4H  PRN


 PO  4/27/18 05:30


     


 


 


  (Americaine 20%


 Top Spr)  1 spray  Q4H  PRN


 TOPICAL  4/27/18 05:30


     


 


 


  (Tucks Pads)  1 applic  QID  PRN


 TOPICAL  4/27/18 05:30


     


 


 


  (Iris-Colace)  2 tab  Q12H  PRN


 PO  4/27/18 05:30


    4/28/18 08:29


 


 


  (Ambien)  5 mg  HS  PRN


 PO  4/27/18 05:30


     


 


 


  (Mag-Al Plus


 Susp Liq)  15 ml  Q8H  PRN


 PO  4/27/18 05:30


     


 


 


  (Zofran  Odt)  4 mg  Q6H  PRN


 PO  4/27/18 05:30


     


 











Assessment/Plan


Problem List:  


(1) Normal vaginal delivery


ICD Codes:  O80 - Encounter for full-term uncomplicated delivery


Status:  Acute


(2) Term pregnancy


ICD Codes:  Z34.80 - Encounter for supervision of other normal pregnancy, 

unspecified trimester


(3) Encounter for induction of labor


ICD Codes:  Z34.90 - Encounter for supervision of normal pregnancy, unspecified

, unspecified trimester


Assessment and Plan


PPD#2


breastfeeding


continue supportive care


will have anesthesia see pt today, if cleared by them will discharge to home


Discharge Planning


routine











Shanda Call MD Apr 29, 2018 08:35